# Patient Record
Sex: FEMALE | Race: WHITE | NOT HISPANIC OR LATINO
[De-identification: names, ages, dates, MRNs, and addresses within clinical notes are randomized per-mention and may not be internally consistent; named-entity substitution may affect disease eponyms.]

---

## 2017-05-26 ENCOUNTER — HEALTH MAINTENANCE LETTER (OUTPATIENT)
Age: 37
End: 2017-05-26

## 2019-09-28 ENCOUNTER — HEALTH MAINTENANCE LETTER (OUTPATIENT)
Age: 39
End: 2019-09-28

## 2019-10-01 ENCOUNTER — OFFICE VISIT (OUTPATIENT)
Dept: OBGYN | Facility: CLINIC | Age: 39
End: 2019-10-01
Payer: COMMERCIAL

## 2019-10-01 VITALS
DIASTOLIC BLOOD PRESSURE: 68 MMHG | BODY MASS INDEX: 32.15 KG/M2 | WEIGHT: 204.8 LBS | SYSTOLIC BLOOD PRESSURE: 126 MMHG | TEMPERATURE: 97.9 F | HEIGHT: 67 IN | RESPIRATION RATE: 18 BRPM | HEART RATE: 92 BPM

## 2019-10-01 DIAGNOSIS — Z01.419 ENCOUNTER FOR GYNECOLOGICAL EXAMINATION WITHOUT ABNORMAL FINDING: Primary | ICD-10-CM

## 2019-10-01 DIAGNOSIS — Z87.42 STATUS POST CERVICAL POLYP REMOVAL: ICD-10-CM

## 2019-10-01 DIAGNOSIS — N84.1 CERVICAL POLYP: ICD-10-CM

## 2019-10-01 DIAGNOSIS — N92.1 METRORRHAGIA: ICD-10-CM

## 2019-10-01 DIAGNOSIS — Z98.890 STATUS POST CERVICAL POLYP REMOVAL: ICD-10-CM

## 2019-10-01 PROCEDURE — 87624 HPV HI-RISK TYP POOLED RSLT: CPT | Performed by: OBSTETRICS & GYNECOLOGY

## 2019-10-01 PROCEDURE — G0145 SCR C/V CYTO,THINLAYER,RESCR: HCPCS | Performed by: OBSTETRICS & GYNECOLOGY

## 2019-10-01 PROCEDURE — 57500 BIOPSY OF CERVIX: CPT | Performed by: OBSTETRICS & GYNECOLOGY

## 2019-10-01 PROCEDURE — 99203 OFFICE O/P NEW LOW 30 MIN: CPT | Mod: 25 | Performed by: OBSTETRICS & GYNECOLOGY

## 2019-10-01 PROCEDURE — 88305 TISSUE EXAM BY PATHOLOGIST: CPT | Performed by: OBSTETRICS & GYNECOLOGY

## 2019-10-01 ASSESSMENT — MIFFLIN-ST. JEOR: SCORE: 1645.56

## 2019-10-01 NOTE — PROGRESS NOTES
CC:  Consult from [facility] for irregular bleeding  HPI:  Trinity Alcaraz is a 39 year old female is a   .  Patient's last menstrual period was 2019.  Menses are regular q 28-30 days, lasting 4 days. She has had daily bleeding as well as postcoital bleeding for the last 3 months  She has no other bleeding diatheses  She is sexually active using PPTL for contraception  She also c/o LL back pain that seems to be related to the bleeding and worse with her period.  She does heavy lifting at work but does not recall any injury    Patients records are available and reviewed at today's visit.    Past GYN history:  No STD history       Last PAP smear:  Normal- last pap was 15 years ago    History reviewed. No pertinent past medical history.    Past Surgical History:   Procedure Laterality Date      SECTION      preevious  and baby >9#     GYN SURGERY  ,    2 Csections  / baby over 10#       Family History   Problem Relation Age of Onset     Cancer Father 60        lung cancer     C.A.D. Father        Allergies: Patient has no known allergies.    Current Outpatient Medications   Medication Sig Dispense Refill     varenicline (CHANTIX LISET) 0.5 MG X 11 & 1 MG X 42 tablet Take 1 tablet by mouth See Admin Instructions. 0.5 mg tab daily x 3 days, 0.5 mg tab twice daily x 4 day, then 1 mg twice daily (Patient not taking: Reported on 10/1/2019) 53 tablet 3       ROS:  C: NEGATIVE for fever, chills, change in weight  I: NEGATIVE for worrisome rashes, moles or lesions  E: NEGATIVE for vision changes or irritation  E/M: NEGATIVE for ear, mouth and throat problems  R: NEGATIVE for significant cough or SOB  CV: NEGATIVE for chest pain, palpitations or peripheral edema  GI: NEGATIVE for nausea, abdominal pain, heartburn, or change in bowel habits  : NEGATIVE for frequency, dysuria, hematuria, vaginal discharge  M: NEGATIVE for significant arthralgias or myalgia  N: NEGATIVE for weakness, dizziness or  "paresthesias  E: NEGATIVE for temperature intolerance, skin/hair changes  P: NEGATIVE for changes in mood or affect    EXAM:  Blood pressure 126/68, pulse 92, temperature 97.9  F (36.6  C), resp. rate 18, height 1.708 m (5' 7.25\"), weight 92.9 kg (204 lb 12.8 oz), last menstrual period 09/24/2019.   BMI= Body mass index is 31.84 kg/m .  General - pleasant female in no acute distress.  Neck - supple without lymphadenopathy or thyromegaly.  Abdomen - soft, nontender, nondistended, no hepatosplenomegaly. Well healed Pfannenstiel scar with attenuated skin on the right apex of the scar  Pelvic - EG: normal adult female,   BUS: within normal limits,   Vagina: well rugated, no discharge,   Cervix: no lesions or CMT, 2 cm hemorrhagic polyp- friable at the cervical os.  Uterus: firm, normal sized and nontender,Mobile and  Normal size Adnexae: no masses or tenderness.  Rectovaginal - deferred.  Musculoskeletal - no gross deformities.  Neurological - normal strength, sensation, and mental status.  Procedure:  After receiving informed consent, A speculum was placed into the Vagina and the cervix was isolated.  No anesthesia was provided.  The hemorrhagic polyp was grasped with a ring forceps and twisted until free.  The polyp was placed into formalin and sent for pathologic evaluation.  She tolerated the procedure well      ASSESSMENT/PLAN:  (Z01.419) Encounter for gynecological examination without abnormal finding  (primary encounter diagnosis)  Comment: no pap in 15 years  Plan: Pap imaged thin layer screen with HPV -         recommended age 30 - 65 years (select HPV order        below), HPV High Risk Types DNA Cervical            (N92.1) Metrorrhagia  Comment: likely due to the polyp  Plan: US Pelvic Complete w Transvaginal            (N84.1) Cervical polyp  (Z98.890,  Z87.42) Status post cervical polyp removal  Comment:   Plan: path submission        Letter will be sent to the referring provider.    Xu Victor, " MD

## 2019-10-03 LAB
COPATH REPORT: NORMAL
COPATH REPORT: NORMAL
PAP: NORMAL

## 2019-10-04 ENCOUNTER — HOSPITAL ENCOUNTER (OUTPATIENT)
Dept: ULTRASOUND IMAGING | Facility: CLINIC | Age: 39
Discharge: HOME | End: 2019-10-04
Attending: OBSTETRICS & GYNECOLOGY | Admitting: OBSTETRICS & GYNECOLOGY
Payer: COMMERCIAL

## 2019-10-04 DIAGNOSIS — N92.1 METRORRHAGIA: ICD-10-CM

## 2019-10-04 PROCEDURE — 76830 TRANSVAGINAL US NON-OB: CPT

## 2019-10-08 LAB
FINAL DIAGNOSIS: NORMAL
HPV HR 12 DNA CVX QL NAA+PROBE: NEGATIVE
HPV16 DNA SPEC QL NAA+PROBE: NEGATIVE
HPV18 DNA SPEC QL NAA+PROBE: NEGATIVE
SPECIMEN DESCRIPTION: NORMAL
SPECIMEN SOURCE CVX/VAG CYTO: NORMAL

## 2019-10-12 NOTE — RESULT ENCOUNTER NOTE
Trinity   Your Polyp result was benign.  Your uterine lining is a bit thicker than expected.  I think that we should move to a D&C (Dilation and curettage)  to remove the extra lining.  That would be an outpatient procedure in the Operating room.  Please let me know what you think.  Xu Victor MD

## 2021-01-10 ENCOUNTER — HEALTH MAINTENANCE LETTER (OUTPATIENT)
Age: 41
End: 2021-01-10

## 2021-10-23 ENCOUNTER — HEALTH MAINTENANCE LETTER (OUTPATIENT)
Age: 41
End: 2021-10-23

## 2022-02-12 ENCOUNTER — HEALTH MAINTENANCE LETTER (OUTPATIENT)
Age: 42
End: 2022-02-12

## 2022-08-01 ENCOUNTER — OFFICE VISIT (OUTPATIENT)
Dept: OBGYN | Facility: OTHER | Age: 42
End: 2022-08-01
Payer: COMMERCIAL

## 2022-08-01 VITALS
BODY MASS INDEX: 33.32 KG/M2 | DIASTOLIC BLOOD PRESSURE: 72 MMHG | WEIGHT: 214.31 LBS | SYSTOLIC BLOOD PRESSURE: 139 MMHG | HEART RATE: 79 BPM

## 2022-08-01 DIAGNOSIS — Z20.2 EXPOSURE TO HUMAN PAPILLOMAVIRUS: ICD-10-CM

## 2022-08-01 DIAGNOSIS — B00.2 ORAL HERPES: Primary | ICD-10-CM

## 2022-08-01 PROCEDURE — 99213 OFFICE O/P EST LOW 20 MIN: CPT | Performed by: ADVANCED PRACTICE MIDWIFE

## 2022-08-01 RX ORDER — VALACYCLOVIR HYDROCHLORIDE 1 G/1
2000 TABLET, FILM COATED ORAL 2 TIMES DAILY
Qty: 4 TABLET | Refills: 3 | Status: SHIPPED | OUTPATIENT
Start: 2022-08-01 | End: 2022-08-02

## 2022-08-01 NOTE — PROGRESS NOTES
Trinity Alcaraz is a 42 year old who presents to the clinic for discussion of HPV.   Her partner was recently diagnosed with genital warts on his scrotum and has been using immoquid with what sounds like moderate success with some of the lesions.   Trinity is worried that she had them, could get them, has cancer.   She currently has no lesions.  She is UTD with her pap smear which was normal in 2019.   She is a smoking and is moving toward quitting.   She would also like a refill of her valtrex for her oral herpes.      Histories reviewed and updated  Past Medical History:   Diagnosis Date     Tobacco use disorder, mild, abuse      Past Surgical History:   Procedure Laterality Date      SECTION  2004    preevious  and baby >9#     GYN SURGERY  ,    2 Csections  / baby over 10#     Social History     Socioeconomic History     Marital status: Single     Spouse name: Not on file     Number of children: Not on file     Years of education: Not on file     Highest education level: Not on file   Occupational History     Not on file   Tobacco Use     Smoking status: Current Every Day Smoker     Packs/day: 0.50     Years: 13.00     Pack years: 6.50     Types: Cigarettes     Smokeless tobacco: Never Used     Tobacco comment: decreased   Substance and Sexual Activity     Alcohol use: Yes     Comment: monthly or less     Drug use: No     Sexual activity: Yes     Partners: Male     Birth control/protection: Surgical, Female Surgical     Comment: tubal 2004   Other Topics Concern     Parent/sibling w/ CABG, MI or angioplasty before 65F 55M? 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     Hobby Hazards No     Sleep Concern No     Stress Concern Yes     Comment: work and kids typical stresses     Weight Concern Yes      Special Diet No     Comment: try to eat healthy     Back Care Yes     Comment: low back pain after C Sections     Exercise No     Comment: normal stretching     Bike Helmet Not Asked     Comment: N/A     Seat Belt Yes     Self-Exams Yes     Comment: breast and skin checks   Social History Narrative     Not on file     Social Determinants of Health     Financial Resource Strain: Not on file   Food Insecurity: Not on file   Transportation Needs: Not on file   Physical Activity: Not on file   Stress: Not on file   Social Connections: Not on file   Intimate Partner Violence: Not on file   Housing Stability: Not on file     Family History   Problem Relation Age of Onset     Hypertension Mother      Diabetes Father      Cancer Father 60        lung cancer     C.A.D. Father      Irritable Bowel Syndrome Sister      No Known Problems Sister      No Known Problems Brother      No Known Problems Brother      No Known Problems Son      No Known Problems Son           ROS: 10 point ROS neg other than the symptoms noted above in the HPI.    EXAM:  /72 (BP Location: Left arm, Patient Position: Sitting, Cuff Size: Adult Large)   Pulse 79   Wt 97.2 kg (214 lb 5 oz)   LMP 07/28/2022 (Exact Date)   BMI 33.32 kg/m            ASSESSMENT/PLAN:    (B00.2) Oral herpes  (primary encounter diagnosis)  Comment:   Plan: valACYclovir (VALTREX) 1000 mg tablet            (Z20.2) Exposure to human papillomavirus  Comment:   Plan:         Labs were reviewed in Epic  And her pap was normal    Imaging was reviewed in Epic. And the results were from an ultrasound in 2019 were normal other than a small fibroid.       30 minutes spent on the date of the encounter doing chart review, review of test results, patient visit and documentation     We discussed HPV, acquisition, course of infection and outcomes.  Also that there is no why to know who had it first etc.  Also discussed prevention of spread.   We discussed smoking in relation to HPV  and the possibility that she could be vaccinated if she desired.   She will check with insurance regarding coverage for vaccination,   Will follow up with Deepina to see if her chart can be merged with Care everywhere.

## 2022-10-09 ENCOUNTER — HEALTH MAINTENANCE LETTER (OUTPATIENT)
Age: 42
End: 2022-10-09

## 2023-02-18 ENCOUNTER — HEALTH MAINTENANCE LETTER (OUTPATIENT)
Age: 43
End: 2023-02-18

## 2023-11-02 DIAGNOSIS — B00.2 ORAL HERPES: ICD-10-CM

## 2023-11-02 RX ORDER — VALACYCLOVIR HYDROCHLORIDE 1 G/1
2000 TABLET, FILM COATED ORAL 2 TIMES DAILY
Qty: 4 TABLET | Refills: 3 | OUTPATIENT
Start: 2023-11-02

## 2023-11-16 ENCOUNTER — TELEPHONE (OUTPATIENT)
Dept: OBGYN | Facility: OTHER | Age: 43
End: 2023-11-16
Payer: COMMERCIAL

## 2023-11-16 DIAGNOSIS — B00.2 ORAL HERPES: ICD-10-CM

## 2023-11-16 RX ORDER — VALACYCLOVIR HYDROCHLORIDE 1 G/1
2000 TABLET, FILM COATED ORAL 2 TIMES DAILY
Qty: 4 TABLET | Refills: 3 | OUTPATIENT
Start: 2023-11-16

## 2024-03-16 ENCOUNTER — HEALTH MAINTENANCE LETTER (OUTPATIENT)
Age: 44
End: 2024-03-16

## 2025-03-22 ENCOUNTER — HEALTH MAINTENANCE LETTER (OUTPATIENT)
Age: 45
End: 2025-03-22